# Patient Record
Sex: FEMALE | Race: WHITE | Employment: STUDENT | URBAN - METROPOLITAN AREA
[De-identification: names, ages, dates, MRNs, and addresses within clinical notes are randomized per-mention and may not be internally consistent; named-entity substitution may affect disease eponyms.]

---

## 2018-01-05 PROCEDURE — 87389 HIV-1 AG W/HIV-1&-2 AB AG IA: CPT | Performed by: INTERNAL MEDICINE

## 2018-01-05 PROCEDURE — 86803 HEPATITIS C AB TEST: CPT | Performed by: INTERNAL MEDICINE

## 2018-01-05 PROCEDURE — 87491 CHLMYD TRACH DNA AMP PROBE: CPT | Performed by: INTERNAL MEDICINE

## 2018-01-05 PROCEDURE — 86780 TREPONEMA PALLIDUM: CPT | Performed by: INTERNAL MEDICINE

## 2018-01-05 PROCEDURE — 87591 N.GONORRHOEAE DNA AMP PROB: CPT | Performed by: INTERNAL MEDICINE

## 2018-01-05 PROCEDURE — 87340 HEPATITIS B SURFACE AG IA: CPT | Performed by: INTERNAL MEDICINE

## 2018-10-30 PROBLEM — E87.2 METABOLIC ACIDOSIS: Status: ACTIVE | Noted: 2018-10-30

## 2018-10-30 PROBLEM — F10.239 ALCOHOL WITHDRAWAL SYNDROME WITH COMPLICATION (HCC): Status: ACTIVE | Noted: 2018-10-30

## 2018-10-30 PROBLEM — D69.6 THROMBOCYTOPENIA (HCC): Status: ACTIVE | Noted: 2018-10-30

## 2018-10-30 PROBLEM — E87.1 HYPONATREMIA: Status: ACTIVE | Noted: 2018-10-30
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
## 2018-10-30 NOTE — ED PROVIDER NOTES
Patient Seen in: [facility] Emergency Department    History   Patient presents with:  Alcohol Intoxication (neurologic)    Stated Complaint: CIWA 27 - last drink 1900 yesterday. Drinking handle of vodka daily.   Needs de*    HPI    Patient is a 18-y meningismus. LUNGS: Lungs clear to auscultation bilaterally. CARDIOVASCULAR: + S1-S2, regular rate and rhythm, no murmurs. BACK: No CVA tenderness, no midline bony tenderness. ABDOMEN: + Bowel sounds, soft, nontender, nondistended.   No rebound, no guar patient be admitted to med psych for alcohol withdrawal.  Admission disposition: 10/30/2018  4:42 PM                 Disposition and Plan     Clinical Impression:  Alcohol withdrawal syndrome with complication ([address]  (primary encounter diagnosis)    Dispos

## 2018-10-30 NOTE — ED INITIAL ASSESSMENT (HPI)
Pt was at [facility] for \"evaluation\" for alcohol detox. Pt sent to ER for medical evaluation as BP was elevated and pt having withdrawal symptoms. Pt presents with moderate shaking.   Mom brought her here from [facility]

## 2018-10-30 NOTE — ED NOTES
Pt feeling much improved. Mild tremor still visible, but feeling much improved. No further nausea, no headache. Requesting something to eat. Family in room with pt.   Pt appears calm and talking with family

## 2018-10-31 NOTE — PLAN OF CARE
Patient is A&Ox4, family at bedside  Denies any pain or discomfort  No n/v/d  IVF infusing  CIWA protocol in place  Afebrile, VSS  ST on tele; on room air  Seizure precaution maintained - no seizure activity noted  Psych liaison to see patient  Will contin
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
## 2018-10-31 NOTE — PROGRESS NOTES
NURSING ADMISSION NOTE      Patient admitted via Cart  Oriented to room. Safety precautions initiated. Bed in low position. Call light in reach. Admission navigator completed.   Pt placed on tele, ST. BP elevated  IV fluids started   CIWA protocol

## 2018-10-31 NOTE — CONSULTS
Pharmacy Note: Route Optimization for Multivitamin, Folic acid and Thiamine         Mojgan Elizalde currently has orders for a daily infusion with Multivitamin, Folic acid and Thiamine IV.   The patient meets the criteria to convert to the oral equivalents

## 2018-11-01 NOTE — PROGRESS NOTES
[facility] hospitalist daily note  S; per patient she is felling better.  No chest pain, no SOB, no abd pain, nonausea    Medications in Epic    PE vitals in Epic  Gen: awake, alert, no respiratory distress  HEENT; mmm, anicteric  CV: RRR, nl S1/S2  Pulm: CTA b/l
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
## 2018-11-01 NOTE — PLAN OF CARE
NEUROLOGICAL - ADULT    • Achieves stable or improved neurological status Progressing    • Absence of seizures Progressing    • Achieves maximal functionality and self care Progressing          Assumed patient care at 0730. A&Ox4. VSS. Tele, NSR-ST.  Room
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
## 2018-11-02 NOTE — PROGRESS NOTES
NURSING DISCHARGE NOTE    Discharged Home via Wheelchair. Accompanied by Family member and Support staff  Belongings Taken by patient/family. Discussed discharge paperwork and med rec with patient and family- verbalized understanding. Removed tele.

## 2018-11-02 NOTE — PROGRESS NOTES
[facility]  Report of Psychiatric Progress Note    Heladio Garcia Patient Status:  Inpatient    2000 MRN [id number]   [facility] 3NE-A Attending Kerrin Cowden, MD   Hosp Day # 3 PCP Karie Snellen, MD     Date of Admission: 10/3 She was requesting alcohol detox. Her last drink was on 10/29/18 at 7 PM.     She has been drinking 1/5 gallon vodka daily on average the past year.  She has AM tremors and nausea if she doesn't drink in the AM. Since her last drink on 10/29, she has natalieo 4mg IV x 1. Interval hx  11/1/18- She feels \"better\", less anxious, 3/10 today, and no more paranoid ideation. She has a mild tremor and no more headache, nausea, abd upset, or sweating. She feels depressed and tired. NO suicidal ideation.  No voices/ Allergies    Medications:    Current Facility-Administered Medications:   •  Sulfamethoxazole-TMP DS (BACTRIM DS) 800-160 MG per tab 1 tablet, 1 tablet, Oral, BID  •  metoprolol Tartrate (LOPRESSOR) tab 25 mg, 25 mg, Oral, BID PRN  •  benzocaine (ANBESOL) fair  Memory:  intact recent and intact remote  Language: Intact naming and repetition  Fund of Knowledge: Able to recite name of [place] president    Insight: fair in regards to admitting that she has an alcohol use disorder  Judgment: fair in regards to [facility]
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
## 2018-11-05 NOTE — PAYOR COMM NOTE
--------------  DISCHARGE REVIEW    Payor: [address] [place] [address] Road #:  [id number]  Authorization Number: N/A    Admit date: 10/30/18  Admit time:  1835  Discharge Date: 11/2/2018  1:30 PM     Admitting Physician: Reese Metcalf not tender  LFTs improved.  Instructed patient to check LFTs at the follow up with PCP     Hyponatremia resolved     UTI; started Bactrim, tolerating.      Hypokalemia; replaced per protocol    Discharge when ok with psychiatry      History of Present Illne or green/yellow discharge)  5. difficulty breathing, headache or visual disturbances  6. hives  7. persistent dizziness or light-headedness  8. extreme fatigue  9. Numbness/tingling  10. Difficulty urinating or defecating  11.  Bleeding     Do not drive whe

## 2018-12-03 NOTE — ED INITIAL ASSESSMENT (HPI)
Patient arrives requesting help with alcohol detox. Patient denies SI/HI. Patient states she was sober for 30 days and steadily began drinking over the last week.

## 2018-12-03 NOTE — ED PROVIDER NOTES
Patient had a thorough interview with the crisis interventionalists. They also consulted with family. Psychiatrist reviewed the case. It is felt that patient does not a risk of harming herself or others and that she can be discharged.   She is not having

## 2018-12-03 NOTE — ED NOTES
Poison Control notified Aurea Cheatham [phone]) no further treatment/instructions suggested.  Will continue to monitor/ case closed as of 0652 12/03/2018/   Case #[id number]

## 2018-12-03 NOTE — ED NOTES
Patients mom called  She asked for [facility] crisis worker to call her when she is done

## 2018-12-03 NOTE — ED NOTES
Patient is arguing and yelling with her mom on the phone  Informed the patient that until they both calm down she cant talk to her mom on the phone at the moment  Patient in agreement with this

## 2018-12-03 NOTE — ED PROVIDER NOTES
Patient Seen in: [facility] Emergency Department    History   Patient presents with:  Eval-P (psychiatric)  Alcohol Intoxication (neurologic)    Stated Complaint: eval p/ETOH/SI    HPI    Patient is a 23-year-old female with a history of alcohol abu Pulse 108   Temp 97.7 °F (36.5 °C) (Temporal)   Resp 18   Ht 175.3 cm (5' 9\")   Wt 59 kg   LMP 11/19/2018   SpO2 100%   BMI 19.20 kg/m²         Physical Exam    General: Comfortable and well appearing.  Alert and oriented in no distress but she is clinical ETHYL ALCOHOL - Abnormal; Notable for the following components:    Ethyl Alcohol 401 (*)     All other components within normal limits   CBC W/ DIFFERENTIAL - Abnormal; Notable for the following components:    MCH 33.6 (*)     All other components within diagnosis)    Disposition:  There is no disposition on file for this visit. There is no disposition time on file for this visit.     Follow-up:  [facility]  [address] [id number] [phone]  Call in 1 day

## 2018-12-03 NOTE — BH LEVEL OF CARE ASSESSMENT
Level of Care Assessment Note    General Questions  Why are you here?: \"I took too much medication on purpose. \"   Precipitating Events: Pt stated \"I knew how much I was taking. I hadn't taken it in 2 days so I thought I could take 6. \"  Pt stated that s wished you could go to sleep and not wake up? (past 30 days): No  2. Have you actually had any thoughts of killing yourself? (past 30 days): No  6.  Have you ever done anything, started to do anything, or prepared to do anything to end your life? (lifetime) josy  Panic Attacks: Pt stated that she is anxious all of the time. She paces a lot. She reported shakiness and has fears that she will die. Trauma Reaction: Hypervigilance; Flashbacks; Avoidance of reminders of past trauma  Bipolar Symptoms: No problem Support for Recovery  Is your living environment a supportive place for recovery?: Yes  Describe: sister, grandmother      Withdrawal Symptoms  History of Withdrawal Symptoms: Tremors; Anxiety; Seizures; Visual hallucina Appropriate  Flow of Speech: Appropriate  Intensity of Volume: Ordinary  Clarity: Clear  Cognition  Concentration: Unimpaired  Orientation Level: Oriented X4  Insight: Fair  Fair/poor insight as evidenced by: fair insight into behaviors and choices  Judgme of suicidal behavior;Current/past psychiatric disorder;Stressful life events or loss;Sustained stress; Environmental stress; History of trauma  Protective Factors: \"I have never thought about taking my own life. \"      Motivational Stage of Change  Motivati

## 2019-02-15 ENCOUNTER — HOSPITAL ENCOUNTER (EMERGENCY)
Facility: HOSPITAL | Age: 19
Discharge: HOME | End: 2019-02-15
Payer: COMMERCIAL

## 2019-02-15 VITALS
WEIGHT: 130 LBS | DIASTOLIC BLOOD PRESSURE: 82 MMHG | HEART RATE: 72 BPM | RESPIRATION RATE: 16 BRPM | BODY MASS INDEX: 19.26 KG/M2 | OXYGEN SATURATION: 100 % | TEMPERATURE: 98 F | HEIGHT: 69 IN | SYSTOLIC BLOOD PRESSURE: 131 MMHG

## 2019-02-15 DIAGNOSIS — T74.21XA SEXUAL ASSAULT OF ADULT, INITIAL ENCOUNTER: Primary | ICD-10-CM

## 2019-02-15 PROCEDURE — 87591 N.GONORRHOEAE DNA AMP PROB: CPT

## 2019-02-15 PROCEDURE — 87510 GARDNER VAG DNA DIR PROBE: CPT

## 2019-02-15 PROCEDURE — 99284 EMERGENCY DEPT VISIT MOD MDM: CPT

## 2019-02-15 PROCEDURE — 87491 CHLMYD TRACH DNA AMP PROBE: CPT

## 2019-02-15 PROCEDURE — 96372 THER/PROPH/DIAG INJ SC/IM: CPT

## 2019-02-15 PROCEDURE — 87480 CANDIDA DNA DIR PROBE: CPT

## 2019-02-15 PROCEDURE — 87660 TRICHOMONAS VAGIN DIR PROBE: CPT

## 2019-02-15 RX ORDER — METRONIDAZOLE 500 MG/1
2000 TABLET ORAL ONCE
Status: DISCONTINUED | OUTPATIENT
Start: 2019-02-15 | End: 2019-02-15

## 2019-02-15 RX ORDER — ONDANSETRON 4 MG/1
4 TABLET, ORALLY DISINTEGRATING ORAL EVERY 6 HOURS PRN
Qty: 10 TABLET | Refills: 0 | Status: SHIPPED | OUTPATIENT
Start: 2019-02-15 | End: 2019-02-22 | Stop reason: ALTCHOICE

## 2019-02-15 RX ORDER — ONDANSETRON 4 MG/1
4 TABLET, ORALLY DISINTEGRATING ORAL ONCE
Status: COMPLETED | OUTPATIENT
Start: 2019-02-15 | End: 2019-02-15

## 2019-02-15 RX ORDER — AZITHROMYCIN 250 MG/1
1000 TABLET, FILM COATED ORAL ONCE
Status: COMPLETED | OUTPATIENT
Start: 2019-02-15 | End: 2019-02-15

## 2019-02-15 NOTE — ED NOTES
Prasanth Martínez 19year old female XTD:[birth year]  Medical Record #: [id number]  Chief Complaint: EVAL-S     Date of exam: 2/15/2019 Time of Exam: 3185  ED Staff MD: Jl Lynn MD  ED RN: [address] [facility] [address]: [address] [facility]  Time Notified: 0
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
## 2019-02-17 LAB
C TRACH DNA SPEC QL NAA+PROBE: NEGATIVE
N GONORRHOEA DNA SPEC QL NAA+PROBE: NEGATIVE

## 2019-02-22 NOTE — ED NOTES
Manfred Koo presents to the ED to see if blood etoh level was not given to him when records requested from Medical Records.  Informed that what he received ( he presented this RN with a packet of records and release of records sheet) was all that w

## 2019-03-15 ENCOUNTER — LAB ENCOUNTER (OUTPATIENT)
Dept: LAB | Facility: HOSPITAL | Age: 19
End: 2019-03-15
Attending: PEDIATRICS
Payer: COMMERCIAL

## 2019-03-15 DIAGNOSIS — T74.21XA RAPE, INITIAL ENCOUNTER: ICD-10-CM

## 2019-03-15 DIAGNOSIS — E05.00 GRAVES DISEASE: Primary | ICD-10-CM

## 2019-03-15 LAB
HAV IGM SER QL: NONREACTIVE
HBV CORE IGM SER QL: NONREACTIVE
HBV SURFACE AG SERPL QL IA: NONREACTIVE
HCV AB SERPL QL IA: NONREACTIVE
T PALLIDUM AB SER QL IA: NONREACTIVE
T4 FREE SERPL-MCNC: 0.7 NG/DL (ref 0.8–1.7)
THYROGLOB SERPL-MCNC: 24 U/ML (ref ?–60)
THYROPEROXIDASE AB SERPL-ACNC: 42 U/ML (ref ?–60)
TSI SER-ACNC: 3.72 MIU/ML (ref 0.36–3.74)

## 2019-03-15 PROCEDURE — 36415 COLL VENOUS BLD VENIPUNCTURE: CPT

## 2019-03-15 PROCEDURE — 80074 ACUTE HEPATITIS PANEL: CPT

## 2019-03-15 PROCEDURE — 86800 THYROGLOBULIN ANTIBODY: CPT

## 2019-03-15 PROCEDURE — 86780 TREPONEMA PALLIDUM: CPT

## 2019-03-15 PROCEDURE — 87389 HIV-1 AG W/HIV-1&-2 AB AG IA: CPT

## 2019-03-15 PROCEDURE — 84443 ASSAY THYROID STIM HORMONE: CPT

## 2019-03-15 PROCEDURE — 86376 MICROSOMAL ANTIBODY EACH: CPT

## 2019-03-15 PROCEDURE — 84439 ASSAY OF FREE THYROXINE: CPT

## 2019-03-26 NOTE — PROGRESS NOTES
Mom called for results-ok per FYI. They do not have a computer at this time. Explained we have called re: results but no identifier on voicemail,.  Mom aware pt needs repeat labs in 3 mo-6/15/19  Orders are placed

## 2019-03-30 PROBLEM — N30.00 ACUTE CYSTITIS WITHOUT HEMATURIA: Status: ACTIVE | Noted: 2019-03-30

## 2019-03-30 PROBLEM — F10.230 ALCOHOL WITHDRAWAL SYNDROME WITHOUT COMPLICATION (HCC): Status: ACTIVE | Noted: 2019-03-30

## 2019-03-30 NOTE — ED NOTES
Kalpana at bedside at this time to complete assessment for potential [facility] bed for alcohol detox treatment. Unsure if patient will require medical admission today.  If patient not able to stay here at [address], patient states she would rather go to [facility] for detox p

## 2019-03-30 NOTE — ED INITIAL ASSESSMENT (HPI)
Patient to ED for alcohol withdrawal. Patient states last drink was at 0530 this morning. Patient states wants to detox and enter rehab.

## 2019-03-30 NOTE — BH LEVEL OF CARE ASSESSMENT
Level of Care Assessment Note    General Questions  Why are you here?: \"I started having a panic attack and I was shaking. \"   Precipitating Events: Pt stated that she \"wants to feel the way she used to\" which was the last time she detoxed.   She reporte property or thought about it?: No                   IBW Calculations  Weight: 140 lb  BMI (Calculated): 20.7  IBW LBS [place]: 145 LBS  IBW %: 96.55 %  IBW + 10%: 159.5 LBS  IBW - 10%: 130.5 LBS Clear  Cognition  Concentration: Unimpaired  Memory: Recent memory intact; Remote memory intact  Orientation Level: Oriented X4  Insight: Fair  Fair/poor insight as evidenced by:  Fair insight into sx and bx   Judgment: Fair  Fair/poor judgment as evidenced

## 2019-03-30 NOTE — ED NOTES
Per MD will reassess CIWA following additional valium dose and then call nursing supervisor to discuss appropriate bed.

## 2019-03-30 NOTE — ED NOTES
Patient and her mother remain updated with results and plan of care. Plan for medical admission. Patient reports feeling a little bit tired now. VSS. MD remains updated with patient status.  Checked with nursing supervisor to determine if patient is appropr

## 2019-03-30 NOTE — ED PROVIDER NOTES
Patient Seen in: [facility] Emergency Department    History   Patient presents with:  Eval-P (psychiatric)    Stated Complaint: etoh    HPI    73-year-old female, history of since December.   She is been drinking particularly heavily alcohol abuse, h normal. No stridor. Abdominal: Soft. There is no tenderness. There is no guarding. Musculoskeletal: Exhibits no edema or tenderness. Neurological: Pt is alert and oriented to person, place, and time. No cranial nerve deficit.    Skin: Skin is warm and DRAW GOLD   URINE CULTURE, ROUTINE          Blood work reviewed, no acute findings. Patient was given 10 mg of diazepam and felt much better.   Upon reassessment she was still somewhat tremulous and hypertensive so she was given additional diazepam along w

## 2019-03-30 NOTE — ED NOTES
Report received from [facility] at this time.

## 2019-03-30 NOTE — ED NOTES
MD at bedside to reassess at this time. Patient able to rest on cart with family at bedside. Continues to have tremor when she sticks out tongue and holds out arms. Remains updated with plan of care.

## 2019-03-30 NOTE — ED NOTES
Meal tray brought to patient and tolerating well. No nausea/vomiting currently. Remains updated with plan of care.

## 2019-03-30 NOTE — ED NOTES
Report to [facility] in ED. Patient waiting for transport at this time. Mother remains at bedside. Report has been called to [employer] for [id number].

## 2019-03-30 NOTE — ED NOTES
Patient assisted up to bathroom. Able to ambulate with steady gait. Mom and sister remain at bedside. Reports she is feeling a little better. Alert and appropriate. Conversational with family at bedside. Remains updated with plan of care.

## 2019-03-30 NOTE — H&P
[facility] Hospitalist History and Physical      CC: ETOH withdrawal     PCP: Kelley Kunz MD    History of Present Illness: Patient is a 19year old female with PMH sig for long standing ETOH abuse here with symptoms of ETOH withdrawal. She has been trying Sister        Review of Systems: *Negative except as otherwise noted in HPI    Objective:  BP (!) 136/93 (BP Location: Left arm)   Pulse 105   Temp 98.9 °F (37.2 °C) (Oral)   Resp 22   Ht 175.3 cm (5' 9\")   Wt 156 lb (70.8 kg)   LMP 03/23/2019 (Exact Date

## 2019-03-30 NOTE — PROGRESS NOTES
Received patient from ER . ETOH/withdraw. Patient arrived via cart, her mother at bedside. A&O x3, calm and cooperative. CIWA 6 upon admission. Recheck 8. Dr. Ethel Rivas notified. Patient given valium 10 mg po 1 tab. IVF  0.9NS  started.  Heidi

## 2019-03-31 NOTE — BH PROGRESS NOTE
Checked in with RN. Pt not going home today so POC is to see pt tomorrow to determine if pt wants referrals for alcohol tx. Pt assessed in ER by Aurea Beckford on 3/30 and pt initially refused [facility] inpt detox and was admitted to med floor instead.  Pt may be ope

## 2019-03-31 NOTE — PAYOR COMM NOTE
--------------  ADMISSION REVIEW     Payor: [facility] [employer]  Subscriber #:  [id number]  Authorization Number: N/A    Admit date: 3/30/19  Admit time: 26       Admitting Physician: Adonay Mckeon MD  Attending Physician:   Junior O2 Device None (Room air)       Current:BP (!) 135/101   Pulse 97   Temp 98.1 °F (36.7 °C) (Oral)   Resp 20   Ht 175.3 cm (5' 9\")   Wt 63.5 kg   LMP 03/23/2019 (Exact Date)   SpO2 100%   Breastfeeding?  No   BMI 20.67 kg/m²          Physical Exam      Co Lymphocyte Absolute 0.43 (*)     All other components within normal limits   CBC WITH DIFFERENTIAL WITH PLATELET    Narrative: The following orders were created for panel order CBC WITH DIFFERENTIAL WITH PLATELET.   Procedure [facility] Hospitalist History and Physical      CC: ETOH withdrawal     PCP: Merna Perez MD    History of Present Illness: Patient is a 19year old female with PMH sig for long standing ETOH abuse here with symptoms of ETOH withdrawal. She has been try Sister        Review of Systems: *Negative except as otherwise noted in HPI    Objective:  BP (!) 136/93 (BP Location: Left arm)   Pulse 105   Temp 98.9 °F (37.2 °C) (Oral)   Resp 22   Ht 175.3 cm (5' 9\")   Wt 156 lb (70.8 kg)   LMP 03/23/2019 (Exact Date Tachycardia  - Continue tele, NSR  - Excepted with withdrawal and anxiety  - Expect to continue to improve as moves through withdrawal     # Anxiety: denies SI/HI- psych referral  - Has been on meds of anxiety in the past        4/1 [facility]  Recommendations: % None (Room air) —   03/31/19 1000 — 97 — 128/87 — — —   03/31/19 0800 — 106 — 136/91 Abnormal  — — —   03/31/19 0620 — 101 18 141/90 100 % None (Room air) 0 L/min   03/31/19 0412 97.8 °F (36.6 °C) 86 18 136/97 Abnormal  99 % None (Room air) 0 L/min   03/
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
## 2019-03-31 NOTE — PLAN OF CARE
Patient/Family Goals    • Patient/Family Long Term Goal Progressing    • Patient/Family Short Term Goal Progressing            A&Ox4 Room Air  Pt denies pain at this time  Pt is  Clam and Co- operative at this time  Family at the bedside, breakfast was ord

## 2019-03-31 NOTE — PLAN OF CARE
Patient A&O x4, lungs clear, no c/o pain. CIWAs steadly improving, patient continues to have slight tremors but better through shift.   HR has improved,  at rest, now 75-80 at rest.

## 2019-03-31 NOTE — PROGRESS NOTES
[facility] Hospitalist Progress Note                                                                   [address]   6/24/2000    CC: FU    Interval History:  - Feels better today  - Valium need sig decrease in need for valium prn today    # Tachycardia  - Continue tele, NSR  - Excepted with withdrawal and anxiety  - Expect to continue to improve as moves through withdrawal    # Anxiety: denies SI/HI- psych referral  - Has been on meds of anxiety

## 2019-04-01 NOTE — PLAN OF CARE
Patient/Family Goals    • Patient/Family Long Term Goal Progressing    • Patient/Family Short Term Goal Progressing              Pt is A&O x 4 on Room Air  Pt is on tele monitoring, NSR ST when she's up  Medication was given per [facility]  CIWA protocol d/c by ugo

## 2019-04-01 NOTE — PLAN OF CARE
Patient/Family Goals    • Patient/Family Long Term Goal Progressing    • Patient/Family Short Term Goal Progressing          Assumed care at 1930. A&O x 4. On RA tolerating well. NSR on tele, ST when ambulating. IVF infusing. CIWA protocol.  Psych consult p

## 2019-04-01 NOTE — PROGRESS NOTES
04/01/19 1632   Clinical Encounter Type   Visited With Patient   Continue Visiting No  ( remains available at pager #[phone] as needed/requested.)   Sikhism Encounters   Spiritual Requests During Visit / Hospitalization Declines  Visit

## 2019-04-01 NOTE — PROGRESS NOTES
[facility] Hospitalist Progress Note                                                                   [address]   6/24/2000    CC: FU    Interval History:  pt doing well.  No cp, sob, n/v or d ER  - Doing well currently on floor CIWA protocol      # Tachycardia -- RESOLVED  - Continue tele, NSR  - Excepted with withdrawal and anxiety  - Expect to continue to improve as moves through withdrawal    # Anxiety: denies SI/HI- psych referral  - Has be

## 2019-04-01 NOTE — BH PROGRESS NOTE
Liaison does not need to see the pt for the cd tx because she is going to attend a program at [facility].
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
## 2019-04-02 NOTE — PLAN OF CARE
NURSING DISCHARGE NOTE    Discharged Home via Ambulatory. Accompanied by Family member and Support staff  Belongings Taken by patient/family. IV removed, discharge instructions given to patient, all pertinent questions and concerns were addressed.

## 2019-04-02 NOTE — DISCHARGE SUMMARY
General Medicine Discharge Summary     Patient ID:  Jane File  19year old  6/24/2000    Admit date: 3/30/2019    Discharge date and time: 4/2/19    Attending Physician: Alex Escobar DO afterwards.           Activity: activity as tolerated  Diet: regular diet  Wound Care: as directed  Code Status: Full Code      Exam on day of discharge:      04/02/19  0843   BP: 108/77   Pulse: 74   Resp: 17   Temp: 98.1 °F (36.7 °C)       General: no acu

## 2019-04-02 NOTE — PLAN OF CARE
Patient/Family Goals    • Patient/Family Long Term Goal  Stay sober Progressing    • Patient/Family Short Term Goal  Get an intake interview set up Progressing          Assumed care at 1. A&O x 4. On RA tolerating well. NSR on tele.  Seizure precautions

## 2019-04-02 NOTE — PLAN OF CARE
Patient/Family Goals    • Patient/Family Long Term Goal Adequate for Discharge    • Patient/Family Short Term Goal Adequate for Discharge          Patient to be discharged home this afternoon.

## 2019-04-02 NOTE — BH PROGRESS NOTE
Seen the pt and talked with her and the mother. They were given referrals for a psychiatrist and psychotherapist per request per Dr. Prince Elias.

## 2019-05-14 PROCEDURE — 87389 HIV-1 AG W/HIV-1&-2 AB AG IA: CPT | Performed by: OBSTETRICS & GYNECOLOGY

## 2019-05-14 PROCEDURE — 80074 ACUTE HEPATITIS PANEL: CPT | Performed by: OBSTETRICS & GYNECOLOGY

## 2019-06-09 NOTE — ED INITIAL ASSESSMENT (HPI)
Pt here requesting alcohol detox, pt reports last drink was about 24 hours ago. Pt worried she may have a seizure. Denies SI/HI.
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
## 2019-06-09 NOTE — PLAN OF CARE
NURSING ADMISSION NOTE      Patient admitted via stretcher. Oriented to room. Safety precautions initiated. Bed in low position. Call light in reach. Admission navigator completed. Pt alert and oriented. Denies pain. Anxious. CIWA q2h.   VSS
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
## 2019-06-09 NOTE — ED PROVIDER NOTES
Patient Seen in: [facility] Emergency Department    History   Patient presents with:  Eval-D (detox)    Stated Complaint: pt coming in for detox today. saying her last drink yesterday at 0500.  pt state*    HPI    Magdy Lowery is a pleasant 42-year-old fem components within normal limits   COMP METABOLIC PANEL (14) - Normal   CBC WITH DIFFERENTIAL WITH PLATELET    Narrative: The following orders were created for panel order CBC WITH DIFFERENTIAL WITH PLATELET.   Procedure                               Abn

## 2019-06-10 NOTE — PLAN OF CARE
Assumed patient care at 1900  Patient A&O x 4  Complaints of HA- PRN tylenol given with relief noted.    ETOH withdrawal  CIWA Q2-- score 0-5  Sz precautions  PRN ativan for anxiety and PRN ativan for CIWA protocol  VSS  Tele-NSR  Plan for patient to go to distraction and/or relaxation techniques  - Monitor for opioid side effects  - Notify MD/LIP if interventions unsuccessful or patient reports new pain  - Anticipate increased pain with activity and pre-medicate as appropriate  Outcome: Progressing     Prob

## 2019-06-10 NOTE — PAYOR COMM NOTE
--------------  CONTINUED STAY REVIEW    Payor: [address] #:  [id number]  Authorization Number: [id number]    Admit date: 6/9/19  Admit time: 6083         CIWA SCORES:    6/9: 6-3-2-2-7-7-2-3-5    6/10: 5- 3-4-9-5-10

## 2019-06-10 NOTE — PLAN OF CARE
Aox4  VSS, on RA  No c/o pain   Electrolyte protocol, mag and potassium replaced   CIWA protocol   Seizure precautions   0.9 @ 100ml/hr  Psych liaison consulted     Problem: Patient/Family Goals  Goal: Patient/Family Long Term Goal  Description  Patient's Progressing     Problem: SAFETY ADULT - FALL  Goal: Free from fall injury  Description  INTERVENTIONS:  - Assess pt frequently for physical needs  - Identify cognitive and physical deficits and behaviors that affect risk of falls.   - [place] fall precaut
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
## 2019-06-10 NOTE — PAYOR COMM NOTE
--------------  ADMISSION REVIEW     Payor: Joseline Nunez [facility] #:  [id number]  Authorization Number: [id number]    Admit date: 6/9/19  Admit time: [address]       Admitting Physician: Sheryle Bjork, MD  Attending Physician:  Sadiq Pena SpO2 98%   BMI 22.76 kg/m²          Physical Exam  Alert and oriented patient is tremulous HEENT exam is normal lungs are clear cardiovascular exam shows regular rhythm abdomen soft nontender symmetrical cyanosis or edema.        ED Course     Labs Reviewed H&P signed by Dev Jung DO at 6/9/2019  9:57 AM     Author:  Dev Jung DO Service:  — Author Type:  Physician    Filed:  6/9/2019  9:57 AM Date of Service:  6/9/2019  8:53 AM Status:  Signed    :  Dev Jung DO (Physician)           D Pulse 86   Temp 98 °F (36.7 °C) (Oral)   Resp 20   Wt 154 lb 1.6 oz (69.9 kg)   LMP 05/22/2019   SpO2 100%   BMI 22.76 kg/m²    General:  Alert, no distress, appears stated age. Head:  Normocephalic, without obvious abnormality, atraumatic.    Eyes:  Scle bases are clear. IMPRESSION: Nonspecific nonobstructive bowel gas pattern. ASSESSMENT / PLAN:     Patient is a 19year old female  is now here with ETOH intoxication.      Alcohol intoxication, withdrawel and abuse  -[facility] protocol  -MTV, thiamine, Given 1 mg Oral Nathaly Ramírez RN      LORazepam (ATIVAN) injection 1 mg     Date Action Dose Route User    6/9/2019 1440 Given 1 mg Intravenous [place]    6/9/2019 1327 Given 1 mg Intravenous Craig Salgado RN      magnesium oxide (MAG-OX) tab

## 2019-06-11 ENCOUNTER — HOSPITAL ENCOUNTER (EMERGENCY)
Facility: HOSPITAL | Age: 19
Discharge: HOME | End: 2019-06-12
Attending: EMERGENCY MEDICINE
Payer: COMMERCIAL

## 2019-06-11 DIAGNOSIS — F32.A DEPRESSION, UNSPECIFIED DEPRESSION TYPE: Primary | ICD-10-CM

## 2019-06-11 NOTE — PLAN OF CARE
Received patient awake in bed. Claimed she was in severe anxiety and needs 2 mgs Ativan, I told her I need to score her for her symptoms then she can have her Ativan. Her score was 9, so I gave her 1 mg of Ativan. On PADMINI protool.  Seizure precaution, rails falls.  - [place] fall precautions as indicated by assessment.  - Educate pt/family on patient safety including physical limitations  - Instruct pt to call for assistance with activity based on assessment  - Modify environment to reduce risk of injury  -

## 2019-06-11 NOTE — PAYOR COMM NOTE
--------------  CONTINUED STAY REVIEW    Payor: [address] #:  [id number]  Authorization Number: [id number]    6/11:    PADMINI SCORES: 99-64-5-2-4-6    SUBJECTIVE:  CARMEN overnight  Has required multiple doses of ativan tod 40 mEq     Date Action Dose Route User    6/10/2019 1615 Given 40 mEq Oral Kobi Ram RN      0.9%  NaCl infusion     Date Action Dose Route User    6/11/2019 0500 Rate/Dose Verify (none) Intravenous Lesly Covarrubias RN    6/11/2019 0000 New Bag (none)
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
## 2019-06-11 NOTE — PLAN OF CARE
Patient abruptly decided to leave AMA, removed her IV and stormed out of her room.  This RN attempted to stop the patient from leaving the hospital, explained possible consequences of alcohol withdrawal, and emphasized the fact that patient has received two

## 2019-06-11 NOTE — BH PROGRESS NOTE
Just talked with the patients nurse, Richar Hall and she said, the pt just left AMA. Dr. Prince Elias was informed.

## 2019-06-12 VITALS
DIASTOLIC BLOOD PRESSURE: 88 MMHG | TEMPERATURE: 97 F | HEART RATE: 92 BPM | WEIGHT: 152.56 LBS | BODY MASS INDEX: 25.42 KG/M2 | RESPIRATION RATE: 17 BRPM | HEIGHT: 65 IN | SYSTOLIC BLOOD PRESSURE: 131 MMHG | OXYGEN SATURATION: 95 %

## 2019-06-12 PROCEDURE — 90792 PSYCH DIAG EVAL W/MED SRVCS: CPT | Performed by: OTHER

## 2019-06-12 RX ORDER — NALTREXONE HYDROCHLORIDE 50 MG/1
50 TABLET, FILM COATED ORAL DAILY
Qty: 30 TABLET | Refills: 1 | Status: ON HOLD | OUTPATIENT
Start: 2019-06-12 | End: 2019-06-20

## 2019-06-12 RX ORDER — HYDROXYZINE HYDROCHLORIDE 25 MG/1
25 TABLET, FILM COATED ORAL 3 TIMES DAILY PRN
Qty: 90 TABLET | Refills: 1 | Status: ON HOLD | OUTPATIENT
Start: 2019-06-12 | End: 2019-06-20

## 2019-06-12 RX ORDER — ESCITALOPRAM OXALATE 20 MG/1
20 TABLET ORAL DAILY
Qty: 30 TABLET | Refills: 1 | Status: ON HOLD | OUTPATIENT
Start: 2019-06-12 | End: 2019-08-29

## 2019-06-12 RX ORDER — SODIUM CHLORIDE 9 MG/ML
500 INJECTION, SOLUTION INTRAVENOUS ONCE
Status: COMPLETED | OUTPATIENT
Start: 2019-06-12 | End: 2019-06-12

## 2019-06-12 RX ORDER — BUSPIRONE HYDROCHLORIDE 15 MG/1
15 TABLET ORAL 3 TIMES DAILY
Qty: 90 TABLET | Refills: 1 | Status: ON HOLD | OUTPATIENT
Start: 2019-06-12 | End: 2019-06-20

## 2019-06-12 RX ORDER — DIAZEPAM 10 MG/1
TABLET ORAL
Qty: 12 TABLET | Refills: 0 | Status: ON HOLD | OUTPATIENT
Start: 2019-06-12 | End: 2019-06-16

## 2019-06-12 NOTE — ED NOTES
Pt suddenly got up instructed to lie back in bed, noted pt's IV line pulled out with wet bed sheets, verbalized initially \" im Bindu Binder go home right now\", reminded about pt's status, started to verbally aggressive, verbal reminder that discharge is not pos
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
## 2019-06-12 NOTE — ED NOTES
Pt aox4. Pt ambulated to bathroom with steady gait. Rn offered breakfast. Pt refused breakfast tray at this time. Pt ready for BRIEN eval. Continuing to monitor pt. Pt apologized for her behavior when she arrived.

## 2019-06-12 NOTE — ED NOTES
Dr. Judah Mendez psychiatrist discussed dc instructions, medications and follow up with patient. Pt verbalized understanding. Belongings returned to patient. Mother accompanied patient to ed exit with steady gait.

## 2019-06-12 NOTE — ED PROVIDER NOTES
Patient is remained calm and cooperative throughout ER evaluation, patient was evaluated by psychiatrist Dr. Radha Kemp in the emergency department and deemed safe to be discharged, psychiatrist did write for prescriptions and handed to the patient.   Patient giv

## 2019-06-12 NOTE — ED PROVIDER NOTES
Patient Seen in: [facility] Emergency Department    History   Patient presents with:  Eval-P (psychiatric)    Stated Complaint: alcohol intoxication, just got out of rehab, admits to taking 8 ativan    HPI    Patient is a 78-year-old woman here with Neck: Normal range of motion. Neck supple. Cardiovascular: Normal rate and intact distal pulses. Pulmonary/Chest: Effort normal. No respiratory distress. Abdominal: Soft. She exhibits no distension. There is no tenderness.    Musculoskeletal: Normal DIFFERENTIAL              MDM   Patient awaiting sobriety so she can be evaluated by Jeovany Castro.               Disposition and Plan     Clinical Impression:  Depression, unspecified depression type  (primary encounter diagnosis)    Disposition:  Discharge

## 2019-06-12 NOTE — ED NOTES
[facility]- [address] [phone].  Pt to admitted to treatment facility
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
## 2019-06-12 NOTE — ED NOTES
BRIEN completed assessment. Dr. Deepika Soria psychiatrist to evaluate patient in ED. Pt declined mother to have assess to patients information and declines mother to come to room. Pt refusing food. Pt calm and cooperative.

## 2019-06-12 NOTE — ED NOTES
Pt belongings were secured with public safety. Pt belongings included:  1 necklace  2 finger rings  1 belly button ring  1 pair of shorts  1 tank top  Jewelry was secured in smaller safety bag which was secured in larger safety bag.
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
## 2019-06-13 NOTE — DISCHARGE SUMMARY
General Medicine Discharge Summary     Patient ID:  Anthony Tarango  19year old  6/24/2000    Admit date: 6/9/2019    Discharge date and time: 6/11/2019  4:00 PM     Attending Physician: Leann Kaufman    Primary Care Physician: Chase Montalvo MD Liaison/Psych Consult    Total Time Coordinating Care: Less than 30 minutes - patient left AMA before med rec completed or follow up appointment arranged.        Thank Ben May MD

## 2019-06-16 PROBLEM — F10.20 ALCOHOL USE DISORDER, SEVERE, DEPENDENCE (HCC): Status: ACTIVE | Noted: 2019-06-16

## 2019-06-16 NOTE — ED NOTES
Family at bedside.   Pt is visibly shaking,  Ativan given,   Water given per request  Pt is alert and cooperative

## 2019-06-16 NOTE — ED NOTES
Patient started to get up set with mom pulling off the monitor. Does not want mom to talk with doctors or nurse. Asked mom to step out to waiting room. Patient dad at bedside he is going to be heading home soon. Phone number in notes.  Patient does want mom

## 2019-06-16 NOTE — ED PROVIDER NOTES
Patient Seen in: [facility] Emergency Department    History   Patient presents with:  Eval-P (psychiatric)  Alcohol Intoxication (neurologic)    Stated Complaint: SI, +ETOH     HPI    20 yo fm pmh of chronic alcohol use now with c/o suicidal attempt Pulmonary/Chest: Effort normal and breath sounds normal.   Abdominal: Soft. Bowel sounds are normal.   Musculoskeletal: She exhibits no edema or deformity. Neurological: She is alert and oriented to person, place, and time.    Skin: Skin is warm and dry Abnormality         Status                     ---------                               -----------         ------                     CBC W/ DIFFERENTIAL[[id number]]          Abnormal            Final result                 Please view results for these asha

## 2019-06-16 NOTE — ED NOTES
Pt ambulated to BR w/ steady gait - is back resting abed at this time with no distress noted. Will continue to monitor. RN spoke with [facility] who reports patient will be assessed at 5am. Family updated and father decided to go home for the night to get sleep.
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
## 2019-06-16 NOTE — ED NOTES
Patients belongings placed inside smart safe bags.  Small bag  [id number] containing necklace and jewelery inside of large bag [id number] containing shoes, shirt pants and bra

## 2019-06-16 NOTE — ED NOTES
Report received from [place], RN     Pt resting abed at this time, no obvious distress noted. Call light in reach, father at bedside. Will continue to monitor.
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
## 2019-07-27 NOTE — ED NOTES
Pt belongings placed in smart safe bag #[id number]. Belongings include grey jacket, black shorts and white shoes. Pt did not come with ID, wallet or phone of any kind. Security called to secure belongings.

## 2019-07-28 NOTE — ED NOTES
Report from [facility]     Pt sleeping abed at this time, no obvious distress noted. VSS. Will continue to monitor.

## 2019-07-28 NOTE — ED NOTES
Received report from [facility]. Pt to have crisis eval by 5am. Pt in no distress. reting in bed comfortably. Given gatorade and sandwich.

## 2019-07-28 NOTE — ED NOTES
Remains asleep, pt will wake to gentle shaking. Pt is on the telemetry and pulse oximetry. Pt continues to be monitored closely.

## 2019-07-28 NOTE — ED NOTES
Pt mom called, wants to be sure she is called as soon as patient is ready for discharge \"hopefully back to inpatient. \" Patient mom states she lives minutes from the hospital and will sleep with her phone.  Pt mom was also given the phone number to [facility]

## 2019-07-31 NOTE — ED INITIAL ASSESSMENT (HPI)
Pt to ED via EMS with ETOH intoxication and possible SI. Pt was in verbal altercation with her mother. Per EMS pt's sister stated to them that patient made SI statements.  Pt states \"I need to go home to my little sister she doesn't deserve to be home clive

## 2019-07-31 NOTE — ED NOTES
Patient stood up from cart and started to throw things in patient room. Patient restrained in 4 point restraints at this time. ED MD, public safety, ED charge RN at bedside.

## 2019-07-31 NOTE — ED PROVIDER NOTES
Patient Seen in: [facility] Emergency Department    History   Patient presents with:  Alcohol Intoxication (neurologic)    Stated Complaint: ETOH SI?    HPI    75-year-old female who presents here to the emergency department Via EMS due to reported motions are intact. No scleral icterus or conjunctival pallor: Neck is supple without tenderness on palpation. Head is atraumatic normocephalic. Oral mucosa moist.  Tongue is midline. No posterior pharyngeal lesions.     Lungs: Clear to auscultation callie MDM   Patient's family will be contacted to find exactly what the patient stated to them as to why they called EMS personnel.   Police also came by and they told us that the patient did make suicidal statements as well as the family corroborate

## 2019-07-31 NOTE — ED NOTES
Mother at Triage Desk looking for update; she would like to speak to Rose Read when they are able to assess her. She can be reached at the number on patient's chart.

## 2019-07-31 NOTE — ED NOTES
Per Andrade Debora, patient's mother, pt was at [facility] in [facility] for rehab and left yesterday. This morning she told her mother she needs help. Told mom \"my life is completely nothing I hate my life\".   Threw watermelon threw the window, took a pole to the c

## 2019-08-01 NOTE — BH LEVEL OF CARE ASSESSMENT
Level of Care Assessment Note    General Questions  Why are you here?: \"I don't know. I think my mom called the police on me. \"  Precipitating Events: Pt reported that she was drinking prior to being brought to the ED but does not remember the events that a pool stick while intoxicated. She reported pt's sister stated that pt told her that she would kill herself today. Pt's mother reported going to all the liquor stores in the area and telling the owners not to sell alcohol to pt.  Pt's mother reported that Others/Property  Have you harmed someone or had thoughts about wanting someone harmed or killed in the past 30 days?: No  Have you harmed someone or had thoughts about wanting someone harmed or killed further back than 30 days?: No  Current or Past Harm To 150mg Seroquel XR. Pt reported taking Seroquel in the past \"so I don't see or hear things\". She reported that she no longer has hallucinations but takes the medication to help her sleep.    Appetite Symptoms: Normal for patient  Unplanned Weight Loss: No pattern of use?: Pt reported drinking 1/5 of Vodka weekly today and sober 34 days prior. Prior to treatment once every other day.    Last Use?: 7/30  Is your current use the most/worst it has ever been? : Yes    Illicit and Prescription Drug Use  Which if No  Describe Concerns/Conflicts with Social Relationships[de-identified] Pt reported that her family hates her but also loves her. Pt reported that her family does not understand that she wants to drink and hang out with friends during the summer.   Decreased Functional Coherent  Flow: Organized  Content: Ordinary  Level of Consciousness: Alert  Level of Consciousness: Alert  Behavior  Exhibited behavior: Appropriate to situation    Assessment Summary  Assessment Summary: Pt is a 23-year-old female who was brought to the treatment;Pattern of impulsive decision making  Protective Factors: Pt reported that she has two dogs. She stated \"I have a loving family even though they annoy me. I have a lot to give to this world. \"    Motivational Stage of Change  Motivational Stage

## 2019-08-01 NOTE — ED PROVIDER NOTES
Patient evaluated by Kris Carlos and when she was sober denied any active suicidal ideations. Patient is known to them and it is felt that these statements were related to her alcohol use.   She is not felt to require emergent inpatient psychiatric admission by bryant

## 2019-08-02 ENCOUNTER — HOSPITAL ENCOUNTER (EMERGENCY)
Facility: HOSPITAL | Age: 19
Discharge: STILL ADMITTED OR ADMINISTRATIVE | End: 2019-08-03
Payer: COMMERCIAL

## 2019-08-02 PROBLEM — F33.2 MAJOR DEPRESSIVE DISORDER, RECURRENT SEVERE WITHOUT PSYCHOTIC FEATURES (HCC): Status: ACTIVE | Noted: 2019-08-02

## 2019-08-02 NOTE — ED NOTES
[facility] parents had concerns of eye and facial droop. Notified ER Phys and together went into Pt room for physical evaluation; no facial droop noted. ER Phys did not deem further evaluation was necessary.

## 2019-08-02 NOTE — ED NOTES
BRIEN assessment in process, advised they spoke w/ Pt Mom and Police  via telephone.   BRIEN now at bedside discussing care plan w/ Pt's Father who is in room at bedside

## 2019-08-02 NOTE — ED NOTES
Informed by Gilberto Mcgee in the ER earlier that Toshia Barnett, police  wanted to give information regarding Carmen's functioning/presentatin.

## 2019-08-02 NOTE — ED NOTES
Davonte Rod,  from [employer], called and is concerned for patient. She states that she is aware that a petition has been filled out by [employer] and wants to ensure that patient will be admitted for psychiatric care.  Davonte Rod can be reached at
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
## 2019-08-02 NOTE — ED PROVIDER NOTES
Patient Seen in: [facility] Emergency Department    History   Patient presents with:  Eval-P (psychiatric)  Alcohol Intoxication (neurologic)    Stated Complaint: eval p si etoh    HPI    Zoë Tamayo is a pleasant 78-year-old female who was brought in by [facility] (*)     All other components within normal limits   ETHYL ALCOHOL - Abnormal; Notable for the following components:    Ethyl Alcohol 387 (*)     All other components within normal limits   ACETAMINOPHEN (TYLENOL), S - Abnormal; No visit. Follow-up:  No follow-up provider specified.       Medications Prescribed:  Current Discharge Medication List

## 2019-08-02 NOTE — ED NOTES
Pt Mom and Dad left ER to go home and change clothes, but advised they are \"a phone call away\" if needed and can be reached at the following:  Luz Desouza (Mom) [facility] [phone]

## 2019-08-02 NOTE — ED NOTES
Pt given fresh warm blankets, Pt placed on full cardiac & Sp02 monitor, Pt given med w/o difficulty, Pt and Father informed, Pt calm and cooperative, no complaints, will order Pt food.

## 2019-08-02 NOTE — ED NOTES
Updated German Smiley RN regarding concerns expressed by parents regarding eye and mouth dropping. Advised ER physician is aware and issue has been assessed.

## 2019-08-02 NOTE — ED NOTES
Spoke to father who is in the room with Zoë Tamayo. Attempted to interview Zoë Tamayo. When asked how she need up in the ER she responded, \"I don't feel good. I don't feel good. I feel nauseous. \"  She repeated the same statements with subsequent questions an

## 2019-08-02 NOTE — ED NOTES
Checked on PT, who is arousable to verbal stimuli, yet still lethargic, Pt ate some of her chocolate chip cookie, advised she is feeling nauseated.  Will admin anti nausea medication

## 2019-08-02 NOTE — ED NOTES
Confirmed w/ security that Pt belongings have been collected and safely placed in locked security locker

## 2019-08-03 NOTE — ED NOTES
Pt Mom and Sister in the room. [facility] updating family to care plan.

## 2019-08-03 NOTE — ED NOTES
Patient resting comfortably. More agitated. Think she is withdrawing. Patient examined. No facial droop or focal neurological findings. Will treat with Ativan.

## 2019-08-03 NOTE — ED NOTES
After Pt Mom in room for approx 15-20 min Pt became agitated, asking to go home, Pt began pulling cardiac leads from her chest, taking off pulse ox and climbing out of bed. Pt Mom stated \"you can't leave\" Pt replied \"I have to go to the bathroom. \"  ER

## 2019-08-03 NOTE — ED NOTES
Pt ambulatory to the bathroom independently and w/o difficulty, Pt walked w/ steady gait. Pt updated to care plan.  Pt sitting up, watching TV, awake and alert, eyes open spontaneously

## 2019-08-03 NOTE — ED NOTES
RN to RN rpt given, all questions answered, w/o difficulty; EAS to safely transport Pt to [facility]
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
## 2019-08-03 NOTE — ED NOTES
Pt ate majority of chocolate chip cookie, ate some pizza, drank 2-bottles of Gatorade from nutrition fridge and a 4 oz cup of apple juice. Pt sitting in ER cart w/ bilateral side rails elevated, call light in reach, calm and cooperative at this time.  Pt

## 2019-08-19 NOTE — ED NOTES
Mother of patient called myself as ED Charge RN to relay concern for Xanax prescription given to patient at discharge. Daughter not present to give permission to discuss with mother. Explained HIPPA compliance with mother.   Mother very upset regarding pr

## 2019-08-19 NOTE — ED PROVIDER NOTES
Patient Seen in: [facility] Emergency Department    History   Patient presents with: Anxiety/Panic attack (neurologic)    Stated Complaint: panic attack - has the shakes      HPI    47-year-old female presents for evaluation of anxiety.   Patient ha nontender. Skin: No rash. No edema. Neurologic: No focal neurologic deficits. Normal speech pattern  Musculoskeletal: No tenderness or deformity noted. Full range of motion throughout.         ED Course     Labs Reviewed   COMP METABOLIC PANEL (14) - tablet (0.5 mg total) by mouth 3 (three) times daily as needed for Anxiety.   Qty: 15 tablet Refills: 0

## 2019-08-24 PROBLEM — F10.230 ALCOHOL WITHDRAWAL SYNDROME WITHOUT COMPLICATION (HCC): Status: RESOLVED | Noted: 2019-03-30 | Resolved: 2019-08-24

## 2019-08-24 PROBLEM — F33.2 MAJOR DEPRESSIVE DISORDER, RECURRENT SEVERE WITHOUT PSYCHOTIC FEATURES (HCC): Status: RESOLVED | Noted: 2019-08-02 | Resolved: 2019-08-24

## 2019-09-03 PROCEDURE — 80074 ACUTE HEPATITIS PANEL: CPT | Performed by: OBSTETRICS & GYNECOLOGY

## 2019-10-09 NOTE — ED PROVIDER NOTES
Patient Seen in: [facility] Emergency Department      History   Patient presents with:  Eval-P (psychiatric)    Stated Complaint: SI    HPI    Kamila Garcia is a pleasant 29-year-old female presenting with suicidal ideation.   She does have a history of alc distress  HEENT exam: Tympanic membranes are clear. Canals are normal with no auricular preauricular or mastoid tenderness. Oropharyngeal exam shows no uvula edema or shift.   There is no tongue elevation and palatine tonsils show no purulent material or pending per crisis recommendation              Disposition and Plan     Clinical Impression:  Alcohol abuse  (primary encounter diagnosis)  Alcoholic intoxication without complication ([facility] [address]  Suicidal ideation    Disposition:  There is no disposition on robby

## 2019-10-09 NOTE — ED NOTES
BRIEN called and verified that pt is in line for evaluation. Pt up to provided urine sample. Pt refusing food at this time.

## 2019-10-09 NOTE — ED NOTES
MD place order for pts anxiety. Mother at [facility]. Discussed home medications with family. Family not concerned at this time regarding home medications due to pt being off meds since Friday.  Cleared for Haldol 2.5mg.

## 2019-10-09 NOTE — ED NOTES
Went into the room to do vitals with public safety at the bedside  Patient was demanding ativan  Explained that she cant have any right now  Explained that her vitals were great and that she was still intoxicated  Patient was offered comfort and support  F

## 2019-10-09 NOTE — ED NOTES
Officer Joy Garcia. Officer states that mother called police this morning and notified them that she had sent text messages to her x-boyfriend. Gabby Dorantes \"I am killing myself\" \"goodbye\", boyfriend says \"Cut it out. \" pt states \"I hate you\" multipl

## 2019-10-09 NOTE — ED INITIAL ASSESSMENT (HPI)
Pt presents to ER for SI. Pt texted her boyfriend and threatened harming self. Pt is currently stating no SI. Pt is yelling and agitated. No HI. No hallucinations. Yes alcohol. No drugs. Skin pink warm and dry. Respirations equal and nonlabored.  Pt is a&ox

## 2019-10-10 PROBLEM — F33.2 MAJOR DEPRESSIVE DISORDER, RECURRENT EPISODE, SEVERE (HCC): Status: ACTIVE | Noted: 2019-10-10

## 2019-10-10 NOTE — ED NOTES
Report given to Robbi Haley at [facility]. Patient and Mom updated on plan of care, no questions at this time.
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
## 2019-10-10 NOTE — ED PROVIDER NOTES
I assumed care of the patient from the previous EDMD.  At one point she did become a bit more agitated, requiring additional dose of Haldol. Still awaiting transfer.

## 2019-10-17 ENCOUNTER — APPOINTMENT (OUTPATIENT)
Dept: LAB | Facility: HOSPITAL | Age: 19
End: 2019-10-17
Attending: PHYSICIAN ASSISTANT
Payer: COMMERCIAL

## 2019-10-17 DIAGNOSIS — F10.20 ALCOHOL USE DISORDER, SEVERE, DEPENDENCE (HCC): ICD-10-CM

## 2019-10-17 PROCEDURE — 93005 ELECTROCARDIOGRAM TRACING: CPT

## 2019-10-17 PROCEDURE — 93010 ELECTROCARDIOGRAM REPORT: CPT | Performed by: INTERNAL MEDICINE

## 2019-10-22 ENCOUNTER — EKG ENCOUNTER (OUTPATIENT)
Dept: LAB | Facility: HOSPITAL | Age: 19
End: 2019-10-22
Attending: PHYSICIAN ASSISTANT
Payer: COMMERCIAL

## 2019-10-22 PROCEDURE — 93010 ELECTROCARDIOGRAM REPORT: CPT | Performed by: INTERNAL MEDICINE

## 2019-10-22 PROCEDURE — 93005 ELECTROCARDIOGRAM TRACING: CPT

## 2020-02-13 ENCOUNTER — APPOINTMENT (OUTPATIENT)
Dept: CARDIOLOGY | Age: 20
End: 2020-02-13

## 2020-02-13 ENCOUNTER — APPOINTMENT (OUTPATIENT)
Dept: CARDIOLOGY | Age: 20
End: 2020-02-13
Attending: PEDIATRICS

## 2020-02-25 PROBLEM — G43.009 MIGRAINE WITHOUT AURA OR STATUS MIGRAINOSUS: Status: ACTIVE | Noted: 2020-02-25

## 2020-02-25 PROBLEM — G43.909 ACUTE CONFUSIONAL MIGRAINE: Status: ACTIVE | Noted: 2020-02-25

## 2020-02-25 PROBLEM — R41.82 ALTERED MENTAL STATUS: Status: ACTIVE | Noted: 2020-02-25

## 2020-02-25 NOTE — PROGRESS NOTES
Patient states confusion and difficulty speech and writing on 02/20/2020. Patient states she had headaches for the week prior. Denies fatigue, memory changes or balance issues. Denies numbness or tingling. Denies weakness during incident.  Mental focus appe

## 2020-02-25 NOTE — PROGRESS NOTES
[facility] [id number]   Neurology; INITIAL CLINIC VISIT  2020, 4:17 PM     Ronald Crocker Patient Status:  No patient class for patient encounter    2000 MRN [id number]   Location [address] [place] [address] Breast Cancer in her paternal grandmother; CVA in her maternal grandmother; No Known Problems in her brother, brother, father, mother, sister, and sister; thyroid disorder in her maternal grandmother. SOCIAL HISTORY:   reports that she has quit smoking. /64   Pulse 70   Resp 16   Wt 145 lb (65.8 kg)   LMP 01/30/2020 (LMP Unknown)   BMI 21.41 kg/m²    Body mass index is 21.41 kg/m². General:  Patient is a 22year old female in no acute distress.   appearance: Normal developed and well nourished , in head study.     Problem List Items Addressed This Visit     Acute confusional migraine    Altered mental status    Relevant Orders    MRI BRAIN (W+WO) (CPT=70553)    EEG    Depressive disorder    MARIA D (generalized anxiety disorder) - Primary    Migraine with

## 2020-04-18 ENCOUNTER — APPOINTMENT (OUTPATIENT)
Dept: CT IMAGING | Facility: HOSPITAL | Age: 20
End: 2020-04-18
Attending: PHYSICIAN ASSISTANT
Payer: COMMERCIAL

## 2020-04-18 ENCOUNTER — HOSPITAL ENCOUNTER (EMERGENCY)
Facility: HOSPITAL | Age: 20
Discharge: HOME | End: 2020-04-18
Attending: EMERGENCY MEDICINE
Payer: COMMERCIAL

## 2020-04-18 VITALS
SYSTOLIC BLOOD PRESSURE: 105 MMHG | HEIGHT: 69 IN | HEART RATE: 73 BPM | TEMPERATURE: 98 F | BODY MASS INDEX: 21.48 KG/M2 | WEIGHT: 145 LBS | RESPIRATION RATE: 18 BRPM | DIASTOLIC BLOOD PRESSURE: 74 MMHG | OXYGEN SATURATION: 100 %

## 2020-04-18 DIAGNOSIS — N23 RENAL COLIC: Primary | ICD-10-CM

## 2020-04-18 DIAGNOSIS — N20.1 RIGHT URETERAL STONE: ICD-10-CM

## 2020-04-18 PROCEDURE — 85025 COMPLETE CBC W/AUTO DIFF WBC: CPT | Performed by: PHYSICIAN ASSISTANT

## 2020-04-18 PROCEDURE — 81025 URINE PREGNANCY TEST: CPT

## 2020-04-18 PROCEDURE — 96361 HYDRATE IV INFUSION ADD-ON: CPT

## 2020-04-18 PROCEDURE — 99284 EMERGENCY DEPT VISIT MOD MDM: CPT

## 2020-04-18 PROCEDURE — 80053 COMPREHEN METABOLIC PANEL: CPT | Performed by: PHYSICIAN ASSISTANT

## 2020-04-18 PROCEDURE — 81001 URINALYSIS AUTO W/SCOPE: CPT | Performed by: EMERGENCY MEDICINE

## 2020-04-18 PROCEDURE — 74176 CT ABD & PELVIS W/O CONTRAST: CPT | Performed by: PHYSICIAN ASSISTANT

## 2020-04-18 PROCEDURE — 96374 THER/PROPH/DIAG INJ IV PUSH: CPT

## 2020-04-18 RX ORDER — KETOROLAC TROMETHAMINE 30 MG/ML
15 INJECTION, SOLUTION INTRAMUSCULAR; INTRAVENOUS ONCE
Status: COMPLETED | OUTPATIENT
Start: 2020-04-18 | End: 2020-04-18

## 2020-04-18 RX ORDER — TAMSULOSIN HYDROCHLORIDE 0.4 MG/1
0.4 CAPSULE ORAL DAILY
Qty: 7 CAPSULE | Refills: 0 | Status: SHIPPED | OUTPATIENT
Start: 2020-04-18 | End: 2020-04-25

## 2020-04-18 RX ORDER — TAMSULOSIN HYDROCHLORIDE 0.4 MG/1
0.4 CAPSULE ORAL DAILY
Qty: 7 CAPSULE | Refills: 0 | Status: SHIPPED | OUTPATIENT
Start: 2020-04-18 | End: 2020-04-18

## 2020-04-18 RX ORDER — KETOROLAC TROMETHAMINE 10 MG/1
10 TABLET, FILM COATED ORAL EVERY 6 HOURS PRN
Qty: 20 TABLET | Refills: 0 | Status: SHIPPED | OUTPATIENT
Start: 2020-04-18 | End: 2020-04-23

## 2020-04-18 NOTE — ED INITIAL ASSESSMENT (HPI)
PT c/o right flank pain x2days, Pt saw OBGYN who r/o UTI, although, Pt rpt there was blood in urine, Pt vomited 2-days ago \"because of the pain\" but denies vomiting since

## 2020-04-18 NOTE — ED PROVIDER NOTES
Patient Seen in: [facility] Emergency Department      History   Patient presents with:  Abdomen/Flank Pain    Stated Complaint: R flank pain x couple days     HPI    CHIEF COMPLAINT: Right flank pain x5 days    HISTORY OF PRESENT ILLNESS: José Layton is Former Smoker        Packs/day: 1.00      Smokeless tobacco: Never Used    Alcohol use: Not Currently    Drug use: No             Review of Systems    Positive for stated complaint: R flank pain x couple days   Other systems are as noted in HPI.   Constitut COMP METABOLIC PANEL (14) - Abnormal; Notable for the following components:    Glucose 103 (*)     Sodium 135 (*)     AST 13 (*)     All other components within normal limits   CBC W/ DIFFERENTIAL - Abnormal; Notable for the following components:    Neut Technologist)  Patient complains of right side abdominal pain,nausea,vomiting and blood in urine for 2 days. FINDINGS:  KIDNEYS:  There is a 4.7 mm calculus in the distal right ureter with moderate right hydronephrosis and hydroureter.   Mild soft tissue that an emergency medical condition was not or was no longer present. There was no indication for further evaluation, treatment or admission on an emergency basis.   Comprehensive verbal and written discharge and follow-up instructions were provided to hel

## 2020-06-11 ENCOUNTER — V-VISIT (OUTPATIENT)
Dept: CARDIOLOGY | Age: 20
End: 2020-06-11
Attending: INTERNAL MEDICINE

## 2020-06-11 DIAGNOSIS — R94.31 LONG QT INTERVAL: Primary | ICD-10-CM

## 2020-06-11 DIAGNOSIS — F33.2 SEVERE EPISODE OF RECURRENT MAJOR DEPRESSIVE DISORDER, WITHOUT PSYCHOTIC FEATURES (CMD): ICD-10-CM

## 2020-06-11 DIAGNOSIS — R55 SYNCOPE AND COLLAPSE: ICD-10-CM

## 2020-06-11 PROCEDURE — 99204 OFFICE O/P NEW MOD 45 MIN: CPT | Performed by: INTERNAL MEDICINE

## 2020-06-11 RX ORDER — PROPRANOLOL HYDROCHLORIDE 10 MG/1
TABLET ORAL
COMMUNITY
Start: 2020-05-08

## 2020-06-11 RX ORDER — NORETHINDRONE ACETATE AND ETHINYL ESTRADIOL AND FERROUS FUMARATE 1MG-20(21)
KIT ORAL
COMMUNITY
Start: 2020-05-24 | End: 2021-04-02

## 2020-06-11 RX ORDER — NALTREXONE HYDROCHLORIDE 50 MG/1
50 TABLET, FILM COATED ORAL
COMMUNITY
Start: 2019-06-20

## 2020-06-11 RX ORDER — ESCITALOPRAM OXALATE 10 MG/1
TABLET ORAL
COMMUNITY
Start: 2019-11-21

## 2020-06-11 RX ORDER — TRAZODONE HYDROCHLORIDE 100 MG/1
TABLET ORAL
COMMUNITY
Start: 2020-05-08

## 2020-06-11 SDOH — HEALTH STABILITY: PHYSICAL HEALTH: ON AVERAGE, HOW MANY MINUTES DO YOU ENGAGE IN EXERCISE AT THIS LEVEL?: 60 MIN

## 2020-06-11 SDOH — HEALTH STABILITY: MENTAL HEALTH
STRESS IS WHEN SOMEONE FEELS TENSE, NERVOUS, ANXIOUS, OR CAN'T SLEEP AT NIGHT BECAUSE THEIR MIND IS TROUBLED. HOW STRESSED ARE YOU?: TO SOME EXTENT

## 2020-06-11 SDOH — HEALTH STABILITY: PHYSICAL HEALTH: ON AVERAGE, HOW MANY DAYS PER WEEK DO YOU ENGAGE IN MODERATE TO STRENUOUS EXERCISE (LIKE A BRISK WALK)?: 5 DAYS

## 2020-06-11 ASSESSMENT — ENCOUNTER SYMPTOMS
SUSPICIOUS LESIONS: 0
HEMATOCHEZIA: 0
FEVER: 0
HEMOPTYSIS: 0
WEIGHT GAIN: 0
WEIGHT LOSS: 0
BRUISES/BLEEDS EASILY: 0
COUGH: 0
CHILLS: 0
ALLERGIC/IMMUNOLOGIC COMMENTS: NO NEW FOOD ALLERGIES

## 2020-09-17 ENCOUNTER — TELEPHONE (OUTPATIENT)
Dept: NEUROLOGY | Facility: CLINIC | Age: 20
End: 2020-09-17

## 2021-01-01 ENCOUNTER — EXTERNAL RECORD (OUTPATIENT)
Dept: HEALTH INFORMATION MANAGEMENT | Facility: OTHER | Age: 21
End: 2021-01-01

## 2021-02-16 ENCOUNTER — LAB ENCOUNTER (OUTPATIENT)
Dept: LAB | Age: 21
End: 2021-02-16
Attending: PEDIATRICS
Payer: COMMERCIAL

## 2021-02-16 DIAGNOSIS — J39.8 CONGESTION OF UPPER RESPIRATORY TRACT: Primary | ICD-10-CM

## 2021-02-16 DIAGNOSIS — J39.8 CONGESTION OF UPPER RESPIRATORY TRACT: ICD-10-CM

## 2021-02-17 LAB — SARS-COV-2 RNA RESP QL NAA+PROBE: DETECTED

## 2021-03-11 ENCOUNTER — TELEPHONE (OUTPATIENT)
Dept: CARDIOLOGY | Age: 21
End: 2021-03-11

## 2021-03-18 ENCOUNTER — HOSPITAL ENCOUNTER (OUTPATIENT)
Dept: CV DIAGNOSTICS | Facility: HOSPITAL | Age: 21
Discharge: HOME | End: 2021-03-18
Attending: INTERNAL MEDICINE
Payer: COMMERCIAL

## 2021-03-18 DIAGNOSIS — R55 SYNCOPE AND COLLAPSE: ICD-10-CM

## 2021-03-18 DIAGNOSIS — R94.31 LONG QT INTERVAL: ICD-10-CM

## 2021-03-18 PROCEDURE — 93226 XTRNL ECG REC<48 HR SCAN A/R: CPT | Performed by: INTERNAL MEDICINE

## 2021-03-18 PROCEDURE — 93227 XTRNL ECG REC<48 HR R&I: CPT | Performed by: INTERNAL MEDICINE

## 2021-03-18 PROCEDURE — 93225 XTRNL ECG REC<48 HRS REC: CPT | Performed by: INTERNAL MEDICINE

## 2021-04-02 ENCOUNTER — OFFICE VISIT (OUTPATIENT)
Dept: CARDIOLOGY | Age: 21
End: 2021-04-02

## 2021-04-02 VITALS — HEART RATE: 63 BPM | WEIGHT: 139 LBS | SYSTOLIC BLOOD PRESSURE: 111 MMHG | DIASTOLIC BLOOD PRESSURE: 71 MMHG

## 2021-04-02 DIAGNOSIS — R55 SYNCOPE AND COLLAPSE: Primary | ICD-10-CM

## 2021-04-02 PROCEDURE — 93000 ELECTROCARDIOGRAM COMPLETE: CPT | Performed by: INTERNAL MEDICINE

## 2021-04-02 PROCEDURE — 99215 OFFICE O/P EST HI 40 MIN: CPT | Performed by: INTERNAL MEDICINE

## 2021-05-21 DIAGNOSIS — Z20.822 COVID-19 RULED OUT: Primary | ICD-10-CM

## 2024-06-12 ENCOUNTER — TELEPHONE (OUTPATIENT)
Dept: CARDIOLOGY | Age: 24
End: 2024-06-12

## (undated) NOTE — LETTER
09/17/20    Dear Cortez Amaya,    We are contacting you from Dr. Rachana Beth office. Your health is important to us.   Therefore, we are sending this friendly reminder that you have the following overdue labs and/or tests which were ordered at your last office vi

## (undated) NOTE — ED AVS SNAPSHOT
Isra Conrad   MRN: [id number]    Department:  [facility] Emergency Department   Date of Visit:  8/19/2019           Disclosure     Insurance plans vary and the physician(s) referred by the ER may not be covered by your plan.  Please contact you tell this physician (or your personal doctor if your instructions are to return to your personal doctor) about any new or lasting problems. The primary care or specialist physician will see patients referred from the [facility] Emergency Department.  Sherri Yang

## (undated) NOTE — ED AVS SNAPSHOT
Floerncia Gallardo   MRN: [id number]    Department:  [facility] Emergency Department   Date of Visit:  2/15/2019           Disclosure     Insurance plans vary and the physician(s) referred by the ER may not be covered by your plan.  Please contact you tell this physician (or your personal doctor if your instructions are to return to your personal doctor) about any new or lasting problems. The primary care or specialist physician will see patients referred from the [facility] Emergency Department.  Rachel Plascencia

## (undated) NOTE — ED AVS SNAPSHOT
Isra Conrad   MRN: [id number]    Department:  [facility] Emergency Department   Date of Visit:  12/3/2018           Disclosure     Insurance plans vary and the physician(s) referred by the ER may not be covered by your plan.  Please contact you tell this physician (or your personal doctor if your instructions are to return to your personal doctor) about any new or lasting problems. The primary care or specialist physician will see patients referred from the [facility] Emergency Department.  Delphine Chopra

## (undated) NOTE — ED AVS SNAPSHOT
Suri Jc   MRN: [id number]    Department:  [facility] Emergency Department   Date of Visit:  7/31/2019           Disclosure     Insurance plans vary and the physician(s) referred by the ER may not be covered by your plan.  Please contact you tell this physician (or your personal doctor if your instructions are to return to your personal doctor) about any new or lasting problems. The primary care or specialist physician will see patients referred from the [facility] Emergency Department.  Keya Chavez

## (undated) NOTE — ED AVS SNAPSHOT
Fausto Ogden   MRN: [id number]    Department:  [facility] Emergency Department   Date of Visit:  7/27/2019           Disclosure     Insurance plans vary and the physician(s) referred by the ER may not be covered by your plan.  Please contact you tell this physician (or your personal doctor if your instructions are to return to your personal doctor) about any new or lasting problems. The primary care or specialist physician will see patients referred from the [facility] Emergency Department.  Darren Wood